# Patient Record
(demographics unavailable — no encounter records)

---

## 2024-12-09 NOTE — HISTORY OF PRESENT ILLNESS
[Doing Well] : doing well [Hospitalization] : ~He/She~ was hospitalized [Transplant Date ___] : done [unfilled] [Transplant Center ___] : performed at [unfilled] [Surgeon: ___] : The surgeon was Dr. SHIRLEY [LRRT] : living related donor kidney transplant [End-Stage Kidney Disease] : end-stage kidney disease [BK Virus Infection] : BK viral infection [de-identified] : UTI [de-identified] : from sister [de-identified] : HTN, ?GN [FreeTextEntry1] : renal transplant recipient 3/24/2004, from sister (Katalina- from [place]) at [facility], (Dr Colin) for ESRD on HD for 6 months from HTN, ?GN. 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 Overall doing well. Has not seen dermatologist. Has seen ophthalmologist. Daughter Volodymyr is in [place] attending university studying occupational therapy. Patient is working now cleaning houses in [place]  She has dental appointment this week. Reports no hospitalization/transfusion/infections.  Tac 2/2  ID Pred 5/d Bactrim 1/d Labetalol 200 BID Vit D 50K q weekly Simvastatin 20/d

## 2024-12-09 NOTE — ASSESSMENT
[FreeTextEntry1] : kidney recipient: stable allograft function. Noted prior labs from 2022 Immunosuppression: Continue current medications Hypertension: Well controlled. No symptoms. hyperlipidemia: Advised to continue to take fish oil. Does not want to start statins Discussed health maintenance, derm and eye checks, follow up q 6 months.  Discussed avoiding mosquito and insect bites. Discussed plan of care with patient.

## 2024-12-09 NOTE — PHYSICAL EXAM
[General Appearance - Alert] : alert [General Appearance - In No Acute Distress] : in no acute distress [Sclera] : the sclera and conjunctiva were normal [PERRL With Normal Accommodation] : pupils were equal in size, round, and reactive to light [Extraocular Movements] : extraocular movements were intact [Outer Ear] : the ears and nose were normal in appearance [Oropharynx] : the oropharynx was normal [Neck Appearance] : the appearance of the neck was normal [Neck Cervical Mass (___cm)] : no neck mass was observed [Jugular Venous Distention Increased] : there was no jugular-venous distention [Thyroid Diffuse Enlargement] : the thyroid was not enlarged [Thyroid Nodule] : there were no palpable thyroid nodules [Auscultation Breath Sounds / Voice Sounds] : lungs were clear to auscultation bilaterally [Heart Rate And Rhythm] : heart rate was normal and rhythm regular [Heart Sounds] : normal S1 and S2 [Heart Sounds Gallop] : no gallops [Murmurs] : no murmurs [Heart Sounds Pericardial Friction Rub] : no pericardial rub [Full Pulse] : the pedal pulses are present [Edema] : there was no peripheral edema [Bowel Sounds] : normal bowel sounds [Abdomen Soft] : soft [Abdomen Tenderness] : non-tender [Abdomen Mass (___ Cm)] : no abdominal mass palpated [Cervical Lymph Nodes Enlarged Posterior Bilaterally] : posterior cervical [Cervical Lymph Nodes Enlarged Anterior Bilaterally] : anterior cervical [Supraclavicular Lymph Nodes Enlarged Bilaterally] : supraclavicular [Axillary Lymph Nodes Enlarged Bilaterally] : axillary [Femoral Lymph Nodes Enlarged Bilaterally] : femoral [No CVA Tenderness] : no ~M costovertebral angle tenderness [No Spinal Tenderness] : no spinal tenderness [Abnormal Walk] : normal gait [Nail Clubbing] : no clubbing  or cyanosis of the fingernails [Musculoskeletal - Swelling] : no joint swelling seen [Motor Tone] : muscle strength and tone were normal [Skin Color & Pigmentation] : normal skin color and pigmentation [Skin Turgor] : normal skin turgor [] : no rash [Deep Tendon Reflexes (DTR)] : deep tendon reflexes were 2+ and symmetric [Sensation] : the sensory exam was normal to light touch and pinprick [No Focal Deficits] : no focal deficits [Oriented To Time, Place, And Person] : oriented to person, place, and time [Impaired Insight] : insight and judgment were intact [Affect] : the affect was normal

## 2024-12-09 NOTE — END OF VISIT
[>50% of Time Spent on Counseling for ____] : Greater than 50% of the encounter time was spent on counseling for [unfilled] [Time Spent: ___ minutes] : I have spent [unfilled] minutes of face to face time with the patient [FreeTextEntry1] : DELMIS Guajarod

## 2024-12-09 NOTE — REASON FOR VISIT
[Follow-Up] : a follow-up visit [Family Member] : family member [FreeTextEntry1] : post renal transplant follow up visit, no acute symptoms

## 2024-12-13 NOTE — HEALTH RISK ASSESSMENT
[Very Good] : ~his/her~  mood as very good [Monthly or less (1 pt)] : Monthly or less (1 point) [Little interest or pleasure doing things] : 1) Little interest or pleasure doing things [Feeling down, depressed, or hopeless] : 2) Feeling down, depressed, or hopeless [0] : 2) Feeling down, depressed, or hopeless: Not at all (0) [Never] : Never [With Family] : lives with family [Employed] : employed [Feels Safe at Home] : Feels safe at home [Smoke Detector] : smoke detector [Carbon Monoxide Detector] : carbon monoxide detector [Seat Belt] :  uses seat belt [de-identified] : No [de-identified] : No fast foods. Lots of vegetables. Some red meats. Only home cooked meals.  [[id number]] : 0 [Sexually Active] : not sexually active [High Risk Behavior] : no high risk behavior [Travel to Developing Areas] : does not  travel to developing areas [FreeTextEntry2] :

## 2025-02-13 NOTE — END OF VISIT
[] : Resident [FreeTextEntry3] : 56 yo  presents to re-establish GYN care.  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  She is on tacrolimus, cellcept and prednisone.   Reviewed with pt the need for yearly exams, reinforced need for yearly pap smears and mammograms.  Given multiple years of steroids, recommend DEXA.  pt given colonoscopy referral today (previously given by medicine)

## 2025-02-13 NOTE — END OF VISIT
[] : Resident [FreeTextEntry3] : 58 yo  presents to re-establish GYN care.  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  She is on tacrolimus, cellcept and prednisone.   Reviewed with pt the need for yearly exams, reinforced need for yearly pap smears and mammograms.  Given multiple years of steroids, recommend DEXA.  pt given colonoscopy referral today (previously given by medicine)

## 2025-02-13 NOTE — HISTORY OF PRESENT ILLNESS
[FreeTextEntry1] : 56yo  LMP 7 years ago presents to re-establish care. Patient was last seen by GYN clinic in . Patient has PMSH of renal transplant in  on Tacrolimus, Mycophenolate, and Prednisone. Patient has no acute GYN concerns today. Endorses mild hot flashes and vaginal dryness, not interested in intervention. Is not sexually active, not using any form of contraception. Denies changes in bowel or bladder habits.   OB:  CSx1, 16w miscarriage GYN: denies cysts, fibroids, abnormal paps, STIs PMH: CKD s/p renal transplant, HTN, HLD PSH: renal transplant (), CSx1 Meds: Mycophenolate Mofetil, Tacrolimus, Prednisone, Atorvastatin, Labetalol All: NKDA HCM - last pap 10 yrs ago - [facility]  BIRADS 1 rpt 1yr - Colonoscopy referral given by PCP

## 2025-02-13 NOTE — PLAN
[FreeTextEntry1] : 56yo  LMP 6 years ago presents to re-establish care, last seen in . Patient has no acute GYN concerns.   #HCM - pap performed today, needs yearly pap 2/2 immunosuppression - [facility] UTD - Colonoscopy referral given - DEXA screening starting this year ISO long-term prednisone use  RTC 1yr for annual or sooner PRN Seen and discussed with Dr. Cassie Luque PGY1

## 2025-02-13 NOTE — PLAN
[FreeTextEntry1] : 58yo  LMP 6 years ago presents to re-establish care, last seen in . Patient has no acute GYN concerns.   #HCM - pap performed today, needs yearly pap 2/2 immunosuppression - [facility] UTD - Colonoscopy referral given - DEXA screening starting this year ISO long-term prednisone use  RTC 1yr for annual or sooner PRN Seen and discussed with Dr. Cassie Luque PGY1

## 2025-02-13 NOTE — PHYSICAL EXAM
[Chaperone Present] : A chaperone was present in the examining room during all aspects of the physical examination [Appropriately responsive] : appropriately responsive [Alert] : alert [No Acute Distress] : no acute distress [Soft] : soft [Non-tender] : non-tender [Non-distended] : non-distended [No HSM] : No HSM [No Lesions] : no lesions [No Mass] : no mass [Oriented x3] : oriented x3 [Examination Of The Breasts] : a normal appearance [No Masses] : no breast masses were palpable [Vulvar Atrophy] : vulvar atrophy [Labia Majora] : normal [Labia Minora] : normal [Atrophy] : atrophy [Normal] : normal [Uterine Adnexae] : normal [FreeTextEntry2] : Gilma GÓMEZ [FreeTextEntry5] : flush with vaginal wall, pinpoint external os